# Patient Record
Sex: FEMALE | Race: WHITE | URBAN - METROPOLITAN AREA
[De-identification: names, ages, dates, MRNs, and addresses within clinical notes are randomized per-mention and may not be internally consistent; named-entity substitution may affect disease eponyms.]

---

## 2019-05-21 ENCOUNTER — OFFICE VISIT (OUTPATIENT)
Dept: VASCULAR SURGERY | Facility: CLINIC | Age: 74
End: 2019-05-21
Payer: MEDICARE

## 2019-05-21 DIAGNOSIS — Z53.9 ERRONEOUS ENCOUNTER--DISREGARD: Primary | ICD-10-CM

## 2019-05-21 PROCEDURE — 99207 ZZC VEINSOLUTIONS FREE SCREENING: CPT | Performed by: SURGERY

## 2019-05-21 NOTE — PROGRESS NOTES
"[facility] Office Note    Felipa Santamaria is a 73-year-old female who presents for a free screening regarding bilateral lower extremity spider veins and minimal, asymptomatic varicose veins.  She is concerned because her mother has tremendously swollen legs and now has \"weeping ulcers.\"  Mrs. Santamaria has no history of deep vein thrombosis, superficial thrombophlebitis or hemorrhage from her veins.  She denies any leg pain and has not had any significant swelling.  She has not worn compression hose.    Physical exam  General: Pleasant female in no acute distress.  Extremities: She has scattered telangiectasias about her thighs and legs bilaterally.  There is one small varicosity on the posterior medial aspect of the right calf and a second small varicosity on the left anteromedial leg.    There are no venous stasis changes and there is no significant edema of her ankles.    She has easily palpable dorsalis pedis and posterior tibial pulses.    Impression  Asymptomatic spider telangiectasias and minimal varicose veins bilaterally.  She is at very little risk for complications of her spider veins and minimal varicose veins.  Encouraged her to purchase knee-high compression hose and wear them when possible, especially on long car rides and long plane flights.  I instructed her to contact me or 1 of her other physicians if she develops swelling of her ankles, pain in the area of her varicose veins or other concerns or questions.  I do not see any need to pursue ultrasound interrogation of her lower extremity veins at this time.    Questions were answered.    MAI Tamayo MD    Please send a copy of this dictation to Dr. Velez and a copy to Dr. Srikanth Beltran  "